# Patient Record
Sex: MALE | Race: WHITE
[De-identification: names, ages, dates, MRNs, and addresses within clinical notes are randomized per-mention and may not be internally consistent; named-entity substitution may affect disease eponyms.]

---

## 2018-01-31 ENCOUNTER — HOSPITAL ENCOUNTER (OUTPATIENT)
Dept: HOSPITAL 39 - GMAJ | Age: 43
End: 2018-01-31
Attending: FAMILY MEDICINE
Payer: COMMERCIAL

## 2018-01-31 DIAGNOSIS — Z00.00: Primary | ICD-10-CM

## 2018-05-14 ENCOUNTER — HOSPITAL ENCOUNTER (OUTPATIENT)
Dept: HOSPITAL 39 - GMAJ | Age: 43
End: 2018-05-14
Attending: FAMILY MEDICINE
Payer: COMMERCIAL

## 2018-05-14 DIAGNOSIS — R53.83: Primary | ICD-10-CM

## 2018-06-05 ENCOUNTER — HOSPITAL ENCOUNTER (OUTPATIENT)
Dept: HOSPITAL 39 - GMAJS | Age: 43
End: 2018-06-05
Attending: PHYSICIAN ASSISTANT
Payer: COMMERCIAL

## 2018-06-05 DIAGNOSIS — K82.4: ICD-10-CM

## 2018-06-05 DIAGNOSIS — N39.0: ICD-10-CM

## 2018-06-05 DIAGNOSIS — N28.1: ICD-10-CM

## 2018-06-05 DIAGNOSIS — R10.84: Primary | ICD-10-CM

## 2018-06-05 NOTE — US
EXAM DESCRIPTION: Liver



CLINICAL HISTORY: 43 years Male, RUQ PAIN



COMPARISON: None.



TECHNIQUE: Right upper quadrant abdominal sonogram was performed.



FINDINGS:



Pancreas area is obscured by air in the stomach and other bowel

gas.



Liver length of 14.7 cm is normal. No focal liver lesion. Normal

hepatic parenchymal echogenicity with normal smooth liver

surface. Normal color flow in the portal vein which measures 9 mm

in size. Normal appearance of hepatic veins. Gallbladder is

present and no stones are depicted in the gallbladder lumen. A

small polyp is seen in the gallbladder measuring 5 mm and another

small polyp measures 2 mm. Third polyp measures 3 mm. Gallbladder

wall thickness measures 2 mm. Common duct is normal in caliber

measuring 5 mm.



Right kidney measures 11.2 cm in length. Normal cortical

thickness and echogenicity with no hydronephrosis. Cyst in the

central left kidney is consistent with a parapelvic cyst 1.8 cm.

Small cyst in the upper right kidney measures 9 mm. Positive

color flow in the hilum of the right kidney.



IMPRESSION:



Small gallbladder polyps. No gallstones or evidence of acute

cholecystitis.



Small right renal cysts.









Electronically signed by:  Duglas Ramos MD  6/5/2018 1:08 PM

CDT Workstation: [id number]

## 2018-06-07 ENCOUNTER — HOSPITAL ENCOUNTER (OUTPATIENT)
Dept: HOSPITAL 39 - NM | Age: 43
End: 2018-06-07
Attending: SURGERY
Payer: COMMERCIAL

## 2018-06-07 DIAGNOSIS — R10.11: Primary | ICD-10-CM

## 2018-06-07 PROCEDURE — 78227 HEPATOBIL SYST IMAGE W/DRUG: CPT

## 2018-06-07 NOTE — NM
EXAM DESCRIPTION: 

Hepatobiliary w/CCK: Nuclear Medicine.



CLINICAL HISTORY: 

R10.11. Right upper quadrant pain.



COMPARISON: 

Ultrasound liver 6/5/2018. 



TECHNIQUE: 

Patient was given 8.2  mCi of technetium 99 M mebrofenin

radiopharmaceutical IV. Anterior gamma camera images were

obtained of the right upper quadrant at 1 minute  intervals for

one hour . The patient was then given 16 ounces ensure plus .(11

g of fat). Gallbladder ejection fraction was evaluated by

measuring diminishing radioactivity in the gallbladder, over 30

min interval. 



FINDINGS: 

After radiopharmaceutical was administered, immediate

visualization of the entire liver with no focal areas of

increased or decreased activity. Timely visualization of

intrahepatic ducts, Biliary ducts, gallbladder, and small

intestine.



After fatty meal was taken orally, patient and moderate right

upper quadrant pain. Peak gallbladder activity almost immediately

after fatty meal was ingested. Minimal amount of gallbladder

activity at approximately 30 minutes after fatty meal was

ingested. Decrease in activity during this interval was 18.3%.

Low normal limit is 35%. 



IMPRESSION: 

1. No intrahepatic or extrahepatic biliary obstruction.

2. Significantly decreased gallbladder ejection fraction. This is

most likely related to acalculous cholecystitis, chronic

cholecystitis, or gallbladder dyskinesia. 



Electronically signed by:  Bola Gale MD  6/7/2018 3:15 PM CDT

Workstation: [id number]

## 2018-06-11 ENCOUNTER — HOSPITAL ENCOUNTER (OUTPATIENT)
Dept: HOSPITAL 39 - AMB | Age: 43
Discharge: HOME | End: 2018-06-11
Attending: SURGERY
Payer: COMMERCIAL

## 2018-06-11 VITALS — DIASTOLIC BLOOD PRESSURE: 73 MMHG | SYSTOLIC BLOOD PRESSURE: 122 MMHG | OXYGEN SATURATION: 100 %

## 2018-06-11 VITALS — TEMPERATURE: 97.7 F

## 2018-06-11 DIAGNOSIS — Z87.891: ICD-10-CM

## 2018-06-11 DIAGNOSIS — F32.9: ICD-10-CM

## 2018-06-11 DIAGNOSIS — Z79.899: ICD-10-CM

## 2018-06-11 DIAGNOSIS — K21.9: ICD-10-CM

## 2018-06-11 DIAGNOSIS — Z88.8: ICD-10-CM

## 2018-06-11 DIAGNOSIS — K81.1: Primary | ICD-10-CM

## 2018-06-11 DIAGNOSIS — K90.49: ICD-10-CM

## 2018-06-11 DIAGNOSIS — E11.9: ICD-10-CM

## 2018-06-11 DIAGNOSIS — F41.1: ICD-10-CM

## 2018-06-11 DIAGNOSIS — Z79.84: ICD-10-CM

## 2018-06-11 DIAGNOSIS — Z88.0: ICD-10-CM

## 2018-06-11 PROCEDURE — 81001 URINALYSIS AUTO W/SCOPE: CPT

## 2018-06-11 PROCEDURE — 00790 ANES IPER UPR ABD NOS: CPT

## 2018-06-11 PROCEDURE — 36416 COLLJ CAPILLARY BLOOD SPEC: CPT

## 2018-06-11 PROCEDURE — 36415 COLL VENOUS BLD VENIPUNCTURE: CPT

## 2018-06-11 PROCEDURE — 82150 ASSAY OF AMYLASE: CPT

## 2018-06-11 PROCEDURE — 85025 COMPLETE CBC W/AUTO DIFF WBC: CPT

## 2018-06-11 PROCEDURE — 83690 ASSAY OF LIPASE: CPT

## 2018-06-11 PROCEDURE — 82948 REAGENT STRIP/BLOOD GLUCOSE: CPT

## 2018-06-11 PROCEDURE — 76000 FLUOROSCOPY <1 HR PHYS/QHP: CPT

## 2018-06-11 PROCEDURE — 47563 LAPARO CHOLECYSTECTOMY/GRAPH: CPT

## 2018-06-11 PROCEDURE — 80053 COMPREHEN METABOLIC PANEL: CPT

## 2018-06-11 RX ADMIN — SODIUM CHLORIDE, POTASSIUM CHLORIDE, SODIUM LACTATE AND CALCIUM CHLORIDE ONE MLS: 600; 310; 30; 20 INJECTION, SOLUTION INTRAVENOUS at 12:57

## 2018-06-11 RX ADMIN — BUPIVACAINE HYDROCHLORIDE AND EPINEPHRINE BITARTRATE ONE ML: 2.5; .005 INJECTION, SOLUTION INFILTRATION; PERINEURAL at 11:32

## 2018-06-11 RX ADMIN — SODIUM CHLORIDE, POTASSIUM CHLORIDE, SODIUM LACTATE AND CALCIUM CHLORIDE ONE MLS: 600; 310; 30; 20 INJECTION, SOLUTION INTRAVENOUS at 09:50

## 2018-06-11 RX ADMIN — HEPARIN SODIUM ONE UNITS: 10000 INJECTION, SOLUTION INTRAVENOUS; SUBCUTANEOUS at 11:36

## 2018-06-11 RX ADMIN — LEVOFLOXACIN ONE MLS: 500 INJECTION, SOLUTION INTRAVENOUS at 11:07

## 2018-06-11 RX ADMIN — HYDROCODONE BITARTRATE AND ACETAMINOPHEN ONE EA: 5; 325 TABLET ORAL at 13:35

## 2020-05-05 ENCOUNTER — HOSPITAL ENCOUNTER (OUTPATIENT)
Dept: HOSPITAL 39 - LAB.O | Age: 45
End: 2020-05-05
Attending: NURSE PRACTITIONER
Payer: COMMERCIAL

## 2020-05-05 DIAGNOSIS — E78.1: ICD-10-CM

## 2020-05-05 DIAGNOSIS — D58.0: Primary | ICD-10-CM

## 2020-05-05 DIAGNOSIS — E11.65: ICD-10-CM

## 2020-05-21 ENCOUNTER — HOSPITAL ENCOUNTER (OUTPATIENT)
Dept: HOSPITAL 39 - AMB | Age: 45
Discharge: HOME | End: 2020-05-21
Attending: SPECIALIST
Payer: COMMERCIAL

## 2020-05-21 VITALS — OXYGEN SATURATION: 99 % | DIASTOLIC BLOOD PRESSURE: 86 MMHG | TEMPERATURE: 97.8 F | SYSTOLIC BLOOD PRESSURE: 134 MMHG

## 2020-05-21 DIAGNOSIS — K31.7: ICD-10-CM

## 2020-05-21 DIAGNOSIS — Z87.891: ICD-10-CM

## 2020-05-21 DIAGNOSIS — Z79.899: ICD-10-CM

## 2020-05-21 DIAGNOSIS — K21.0: Primary | ICD-10-CM

## 2020-05-21 DIAGNOSIS — Z88.0: ICD-10-CM

## 2020-05-21 DIAGNOSIS — E11.9: ICD-10-CM

## 2020-05-21 DIAGNOSIS — Z88.8: ICD-10-CM

## 2020-05-21 PROCEDURE — 82948 REAGENT STRIP/BLOOD GLUCOSE: CPT

## 2020-05-21 PROCEDURE — 36416 COLLJ CAPILLARY BLOOD SPEC: CPT

## 2020-05-21 PROCEDURE — 43239 EGD BIOPSY SINGLE/MULTIPLE: CPT

## 2020-05-21 PROCEDURE — 00731 ANES UPR GI NDSC PX NOS: CPT

## 2020-08-03 ENCOUNTER — HOSPITAL ENCOUNTER (OUTPATIENT)
Dept: HOSPITAL 39 - MRI | Age: 45
End: 2020-08-03
Attending: FAMILY MEDICINE
Payer: COMMERCIAL

## 2020-08-03 DIAGNOSIS — S43.432A: ICD-10-CM

## 2020-08-03 DIAGNOSIS — M19.012: ICD-10-CM

## 2020-08-03 DIAGNOSIS — M62.512: ICD-10-CM

## 2020-08-03 DIAGNOSIS — M75.92: ICD-10-CM

## 2020-08-03 DIAGNOSIS — M75.112: Primary | ICD-10-CM

## 2020-08-03 DIAGNOSIS — M75.02: ICD-10-CM

## 2020-08-03 NOTE — MRI
Study: MRI of the Left Shoulder. 



Indication: INCOMPLETE ROTATOR CUFF TEAR OR RUPTURE OF LEFT

SHOULDER



Technique: Multiplanar, multi sequence MRI of the left shoulder

was obtained without intravenous contrast. 



Comparison: None.



Findings:



Moderate hypertrophic AC joint osteoarthritis. Type II acromion

with mild lateral downsloping. Trace subacromial/subdeltoid

bursal fluid.



Supraspinatus and infraspinatus tendinosis with a focal

intermediate grade interstitial tear at the mid supraspinatus

tendon insertion measuring 4 mm AP by 4 mm transverse and

involving 50% of expected tendon thickness.



Subscapularis tendinosis. Teres minor tendon intact. Mild atrophy

and grade 1 fatty infiltration rotator cuff musculature. Long

head biceps tendon intact.



Circumferential labral truncation and fraying, most pronounced

superiorly and posteriorly. Minimal glenohumeral joint

osteoarthritis with a tiny joint effusion. No acute fracture.



Thickening and edema inferior glenohumeral ligament which can be

seen with adhesive capsulitis.  



Impression:



Supraspinatus and infraspinatus tendinosis with focal

intermediate grade interstitial tear at the mid supraspinatus

tendon insertion.



Subscapularis tendinosis.



Mild atrophy and grade 1 fatty infiltration rotator cuff

musculature.



Circumferential labral truncation and fraying, most pronounced

superiorly and posteriorly.



Minimal glenohumeral joint osteoarthritis.



Adhesive capsulitis.



Moderate hypertrophic AC joint osteoarthritis.





Electronically signed by:  Danie Church MD  8/3/2020 7:46 PM CDT

Workstation: [id number]

## 2020-11-09 NOTE — OP
DATE OF PROCEDURE:  06/11/18



PREOPERATIVE DIAGNOSIS:

1.  Right upper quadrant abdominal pain.

2.  Fatty food intolerance.

3.  Abnormal HIDA scan.



POSTOPERATIVE DIAGNOSIS:

1.  Right upper quadrant abdominal pain.

2.  Fatty food intolerance.

3.  Abnormal HIDA scan.

4.  Chronic cholecystitis.

5.  Cholesterolosis.



PROCEDURE:

1.  Laparoscopic cholecystectomy with intraoperative cholangiography using 
fluoroscopy.



SURGEON:  Donald A. Behr, MD.



ASSISTANT:  None.



ANESTHESIA:  Local infiltration of 0.25% Marcaine with epinephrine and general 
endotracheal anesthesia.



INDICATION:  The patient is a 43-year-old male with right upper quadrant pain, 
fatty food intolerance with nausea.  He has had ultrasound and CT scan which 
had question of gallbladder polyps, but no signs of inflammatory process.  HIDA 
scan revealed a decreased ejection fraction last week.  The patient was brought 
to the Surgical Suite today for cholecystectomy after the risks, benefits and 
alternatives to the procedure were discussed and accepted.



FINDINGS:   The gallbladder wall was mildly thickened.  Intraoperative 
cholangiography revealed free flow into the duodenum with no filling defects or 
strictures noted.  Examination of the gallbladder revealed at least two small 
polyps attached to the gallbladder wall consistent with cholesterolosis.



DESCRIPTION OF PROCEDURE:  After adequate general endotracheal anesthesia was 
obtained, the patient was prepped and draped in the usual sterile manner.  
Surgical time-out was taken.  The infraumbilical area was infiltrated with 
local anesthesia.  A curvilinear incision was fashioned and carried down 
through the subcutaneous tissue to the midline fascia using blunt dissection.  
Traction sutures were placed on either side of the midline.  A small incision 
was made in the midline fascia and the peritoneum was opened bluntly.  Jamal 
trocar was introduced under direct vision into the abdominal cavity and fixed 
in place with the 20 mL balloon.  CO2 was then insufflated until a pressure of 
12 mmHg was reached and the abdomen was tympanitic in all four quadrants.  When 
this was done, the laparoscope was introduced.  The abdomen was inspected with 
the previously noted findings.  The patient was then placed in reverse 
Trendelenburg position, turned to the left side.  The upper abdominal ports 
were placed under direct vision.  The gallbladder was grasped, retracted 
anteriorly and laterally.  The neck of the gallbladder was retracted laterally.
  The triangle of Calot was then explored with the cystic duct and cystic 
artery identified and isolated.  The cystic duct was hemoclipped once 
proximally.  The cystic artery was hemoclipped twice proximally and once 
distally.  A small incision was made in the cystic duct. The cholangiogram 
catheter was introduced through a separate stab wound in the right upper 
quadrant, introduced into the cystic duct and clipped in place. Cholangiograms 
were then taken using fluoroscopy which revealed free flow into the duodenum 
with no filling defects or strictures noted.  When this was done, the cystic 
duct catheter was removed.  The cystic duct was hemoclipped three times 
distally and divided between the hemoclips.  The cystic artery was divided.  
The gallbladder was then dissected free from the gallbladder bed of the liver.  
A small amount of leakage was obtained from the gallbladder.  The gallbladder 
was placed in an EndoCatch bag and removed from the infraumbilical port site in 
the usual manner under direct vision.  When this was done, the subhepatic space 
and subphrenic space were irrigated copiously with saline.  The effluent was 
noted to be clear and no longer bile stained.  The natalia hepatis was inspected 
and no bleeding or bile leak was identified.  The gallbladder bed of the liver 
revealed no oozing.  The upper abdominal ports were removed under direct vision 
and good hemostasis was noted. At this point, the CO2, the laparoscope and the 
infraumbilical port were removed.  The infraumbilical port site fascia was 
approximated with a single figure-of-eight suture of 0 Vicryl.   Subcutaneous 
tissue was irrigated with saline.  Skin edges were approximated with 4-0 Vicryl 
subcuticular sutures, benzoin and Steri-Strips. Sterile dressings were applied.
  The patient was awakened and taken to the Recovery Room in good and stable 
condition.  Estimated blood loss was less than 50 mL.  All sponge, needle and 
instrument counts were correct. 



#[id number]
[facility] [place] Counseling: I discussed with the patient the risks of tildrakizumab including but not limited to immunosuppression, malignancy, posterior leukoencephalopathy syndrome, and serious infections.  The patient understands that monitoring is required including a PPD at baseline and must alert us or the primary physician if symptoms of infection or other concerning signs are noted.